# Patient Record
Sex: FEMALE
[De-identification: names, ages, dates, MRNs, and addresses within clinical notes are randomized per-mention and may not be internally consistent; named-entity substitution may affect disease eponyms.]

---

## 2019-06-01 PROBLEM — Z00.00 ENCOUNTER FOR PREVENTIVE HEALTH EXAMINATION: Status: ACTIVE | Noted: 2019-06-01

## 2019-06-12 ENCOUNTER — APPOINTMENT (OUTPATIENT)
Dept: OTOLARYNGOLOGY | Facility: CLINIC | Age: 44
End: 2019-06-12
Payer: MEDICAID

## 2019-06-12 VITALS
DIASTOLIC BLOOD PRESSURE: 86 MMHG | HEART RATE: 70 BPM | WEIGHT: 160 LBS | BODY MASS INDEX: 32.25 KG/M2 | SYSTOLIC BLOOD PRESSURE: 134 MMHG | HEIGHT: 59 IN

## 2019-06-12 DIAGNOSIS — L56.8 OTHER SPECIFIED ACUTE SKIN CHANGES DUE TO ULTRAVIOLET RADIATION: ICD-10-CM

## 2019-06-12 DIAGNOSIS — J34.3 HYPERTROPHY OF NASAL TURBINATES: ICD-10-CM

## 2019-06-12 DIAGNOSIS — Z86.79 PERSONAL HISTORY OF OTHER DISEASES OF THE CIRCULATORY SYSTEM: ICD-10-CM

## 2019-06-12 DIAGNOSIS — J34.2 DEVIATED NASAL SEPTUM: ICD-10-CM

## 2019-06-12 DIAGNOSIS — J30.9 ALLERGIC RHINITIS, UNSPECIFIED: ICD-10-CM

## 2019-06-12 DIAGNOSIS — R51 HEADACHE: ICD-10-CM

## 2019-06-12 DIAGNOSIS — Z86.19 PERSONAL HISTORY OF OTHER INFECTIOUS AND PARASITIC DISEASES: ICD-10-CM

## 2019-06-12 DIAGNOSIS — J32.8 OTHER CHRONIC SINUSITIS: ICD-10-CM

## 2019-06-12 DIAGNOSIS — Z82.49 FAMILY HISTORY OF ISCHEMIC HEART DISEASE AND OTHER DISEASES OF THE CIRCULATORY SYSTEM: ICD-10-CM

## 2019-06-12 PROCEDURE — 31231 NASAL ENDOSCOPY DX: CPT

## 2019-06-12 PROCEDURE — 99203 OFFICE O/P NEW LOW 30 MIN: CPT | Mod: 25

## 2019-06-12 RX ORDER — MONTELUKAST 10 MG/1
10 TABLET, FILM COATED ORAL
Qty: 30 | Refills: 5 | Status: ACTIVE | COMMUNITY
Start: 2019-06-12 | End: 1900-01-01

## 2019-06-12 RX ORDER — AZELASTINE HYDROCHLORIDE 205.5 UG/1
0.15 SPRAY, METERED NASAL
Qty: 1 | Refills: 3 | Status: ACTIVE | COMMUNITY
Start: 2019-06-12 | End: 1900-01-01

## 2019-06-12 RX ORDER — PREDNISONE 10 MG/1
10 TABLET ORAL DAILY
Qty: 18 | Refills: 0 | Status: ACTIVE | COMMUNITY
Start: 2019-06-12 | End: 1900-01-01

## 2019-06-12 NOTE — CONSULT LETTER
[Dear  ___] : Dear  [unfilled], [Courtesy Letter:] : I had the pleasure of seeing your patient, [unfilled], in my office today. [Sincerely,] : Sincerely, [FreeTextEntry2] : Corie Salamanca MD\par [address] [place] [address]\par [place] [phone] [FreeTextEntry1] : \par \par Enclosed please find my office notes for June 12, 2019. \par \par  [FreeTextEntry3] : \par Annette Leon MD \par Otolaryngology, Head and Neck Surgery \par

## 2019-06-12 NOTE — ASSESSMENT
[FreeTextEntry1] : Ms. Reina was evaluated for the following issues today:\par \par 1.) Headaches in bilateral temples suggestive of tension headache.  No TMJ tenderness or click on exam today\par 2.) Allergic rhinitis is severe\par 3.) deviated nasal septum and inferior turbinate hypertrophy narrow nose but worse with allergy\par 4.) chronic sinusitis but sx mainly nasal and allergic\par \par PLAN:\par - montelukast\par - azelastine nasal spray\par - prednisone taper (advised re reactions/side effects)\par \par Return in 1 month\par

## 2019-06-12 NOTE — PHYSICAL EXAM
[] : septum deviated to the left [Nasal Endoscopy Performed] : nasal endoscopy was performed, see procedure section for findings [Midline] : trachea located in midline position [Normal] : orientation to person, place, and time: normal [de-identified] : Carotid pulses 2+ bilateral.

## 2019-06-12 NOTE — PROCEDURE
[FreeTextEntry6] : \par Indication:  chronic sinusitis\par -Verbal consent was obtained from patient prior to exam. \par - Shashi-Synephrine spray applied to nose bilaterally.\par Nasal endoscopy was performed with flexible scope.\par Findings: \par -- Inferior turbinates  edematous, pale and boggy  bilateral.  Inferior meatus clear bilateral.\par -- Septum was deviated to the left \par -- No polyps either side nose\par -- Mucus clear bilateral\par -- Middle and superior turbinates mildly edematous bilateral\par -- Middle meatus congested bilateral.  SER clear bilateral.\par -- Nasopharynx without mass or exudate\par -- Adenoids were absent   \par -- Eustachian orifices were clear bilateral\par \par The patient tolerated the procedure well.\par \par

## 2019-06-12 NOTE — HISTORY OF PRESENT ILLNESS
[de-identified] : Ms. Reina is a 44 yo woman who was evaluated for headaches and sinus problems.\par Communication was facilitated by language line  K[place].\par She was accompanied by her son, who also contributed to history. \par \par She reports years of chronic intermittent headaches in bilateral temples.\par She also has nasal obstruction bilaterally, postnasal drip and nasal itching x years.\par About 3 years ago, she was treated with pills and a nose spray that helped a little.\par She also reports that CT showed migraine and sinusitis.\par She does not recall treatment with antbiotics.\par She has allergies, worse in springtime.\par

## 2019-06-12 NOTE — REVIEW OF SYSTEMS
[Patient Intake Form Reviewed] : Patient intake form was reviewed [As Noted in HPI] : as noted in HPI [Negative] : Heme/Lymph [de-identified] : noise exposure [FreeTextEntry6] : Cough sometimes [de-identified] : numbness

## 2019-07-18 ENCOUNTER — APPOINTMENT (OUTPATIENT)
Dept: OTOLARYNGOLOGY | Facility: CLINIC | Age: 44
End: 2019-07-18